# Patient Record
Sex: MALE | Race: WHITE
[De-identification: names, ages, dates, MRNs, and addresses within clinical notes are randomized per-mention and may not be internally consistent; named-entity substitution may affect disease eponyms.]

---

## 2018-12-29 ENCOUNTER — HOSPITAL ENCOUNTER (EMERGENCY)
Dept: HOSPITAL 95 - ER | Age: 38
LOS: 1 days | Discharge: HOME | End: 2018-12-30
Payer: OTHER GOVERNMENT

## 2018-12-29 VITALS — WEIGHT: 280.01 LBS | BODY MASS INDEX: 42.44 KG/M2 | HEIGHT: 68 IN

## 2018-12-29 DIAGNOSIS — S39.012A: Primary | ICD-10-CM
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
## 2018-12-29 DIAGNOSIS — Z87.891: ICD-10-CM

## 2019-01-23 ENCOUNTER — HOSPITAL ENCOUNTER (OUTPATIENT)
Dept: HOSPITAL 95 - ORSCSDS | Age: 39
Discharge: HOME | End: 2019-01-23
Attending: INTERNAL MEDICINE
Payer: OTHER GOVERNMENT

## 2019-01-23 VITALS — BODY MASS INDEX: 21.2 KG/M2 | HEIGHT: 67.99 IN | WEIGHT: 139.91 LBS

## 2019-01-23 DIAGNOSIS — Z79.899: ICD-10-CM

## 2019-01-23 DIAGNOSIS — G47.33: ICD-10-CM

## 2019-01-23 DIAGNOSIS — E66.01: ICD-10-CM

## 2019-01-23 DIAGNOSIS — I10: ICD-10-CM

## 2019-01-23 DIAGNOSIS — K21.9: Primary | ICD-10-CM

## 2019-01-23 DIAGNOSIS — K31.9: ICD-10-CM

## 2019-01-23 PROCEDURE — 0DB68ZX EXCISION OF STOMACH, VIA NATURAL OR ARTIFICIAL OPENING ENDOSCOPIC, DIAGNOSTIC: ICD-10-PCS | Performed by: INTERNAL MEDICINE

## 2019-01-23 NOTE — NUR
01/23/19 1418 Aishwarya Akers
PT RESTING IN BED, SIDE RAILS IN PLACE, CALL LIGHT WITHIN REACH, VSS.
PT TEACHING COMPLETED, PT DENIES PAIN, DISCOMFORT, AND QUESTIONS AT
THIS TIME.